# Patient Record
Sex: FEMALE | Race: WHITE | NOT HISPANIC OR LATINO | Employment: OTHER | URBAN - METROPOLITAN AREA
[De-identification: names, ages, dates, MRNs, and addresses within clinical notes are randomized per-mention and may not be internally consistent; named-entity substitution may affect disease eponyms.]

---

## 2024-02-09 ENCOUNTER — APPOINTMENT (OUTPATIENT)
Dept: OBSTETRICS AND GYNECOLOGY | Facility: CLINIC | Age: 74
End: 2024-02-09
Payer: MEDICARE

## 2024-09-19 ENCOUNTER — APPOINTMENT (OUTPATIENT)
Dept: CARDIOLOGY | Facility: HOSPITAL | Age: 74
End: 2024-09-19
Payer: MEDICARE

## 2024-09-19 ENCOUNTER — HOSPITAL ENCOUNTER (EMERGENCY)
Facility: HOSPITAL | Age: 74
Discharge: OTHER INSTITUTION | End: 2024-09-19
Attending: STUDENT IN AN ORGANIZED HEALTH CARE EDUCATION/TRAINING PROGRAM
Payer: MEDICARE

## 2024-09-19 ENCOUNTER — APPOINTMENT (OUTPATIENT)
Dept: RADIOLOGY | Facility: HOSPITAL | Age: 74
End: 2024-09-19
Payer: MEDICARE

## 2024-09-19 VITALS
RESPIRATION RATE: 16 BRPM | HEIGHT: 64 IN | SYSTOLIC BLOOD PRESSURE: 113 MMHG | DIASTOLIC BLOOD PRESSURE: 68 MMHG | BODY MASS INDEX: 20.93 KG/M2 | TEMPERATURE: 97.4 F | WEIGHT: 122.58 LBS | OXYGEN SATURATION: 99 % | HEART RATE: 57 BPM

## 2024-09-19 DIAGNOSIS — R74.01 TRANSAMINITIS: ICD-10-CM

## 2024-09-19 DIAGNOSIS — I46.9 CARDIAC ARREST: Primary | ICD-10-CM

## 2024-09-19 DIAGNOSIS — E87.20 LACTIC ACIDOSIS: ICD-10-CM

## 2024-09-19 DIAGNOSIS — R94.31 ABNORMAL EKG: ICD-10-CM

## 2024-09-19 LAB
ALBUMIN SERPL BCP-MCNC: 3.5 G/DL (ref 3.4–5)
ALP SERPL-CCNC: 143 U/L (ref 33–136)
ALT SERPL W P-5'-P-CCNC: 68 U/L (ref 7–45)
ANION GAP SERPL CALCULATED.3IONS-SCNC: 19 MMOL/L (ref 10–20)
APPEARANCE UR: ABNORMAL
AST SERPL W P-5'-P-CCNC: 194 U/L (ref 9–39)
BACTERIA #/AREA URNS AUTO: ABNORMAL /HPF
BILIRUB SERPL-MCNC: 0.6 MG/DL (ref 0–1.2)
BILIRUB UR STRIP.AUTO-MCNC: NEGATIVE MG/DL
BUN SERPL-MCNC: 7 MG/DL (ref 6–23)
CALCIUM SERPL-MCNC: 8.4 MG/DL (ref 8.6–10.3)
CARDIAC TROPONIN I PNL SERPL HS: 19 NG/L (ref 0–13)
CHLORIDE SERPL-SCNC: 101 MMOL/L (ref 98–107)
CO2 SERPL-SCNC: 20 MMOL/L (ref 21–32)
COLOR UR: YELLOW
CREAT SERPL-MCNC: 0.71 MG/DL (ref 0.5–1.05)
EGFRCR SERPLBLD CKD-EPI 2021: 89 ML/MIN/1.73M*2
ERYTHROCYTE [DISTWIDTH] IN BLOOD BY AUTOMATED COUNT: 18.7 % (ref 11.5–14.5)
GLUCOSE SERPL-MCNC: 149 MG/DL (ref 74–99)
GLUCOSE UR STRIP.AUTO-MCNC: ABNORMAL MG/DL
GRAN CASTS #/AREA UR COMP ASSIST: ABNORMAL /LPF
HCT VFR BLD AUTO: 43.5 % (ref 36–46)
HGB BLD-MCNC: 13.7 G/DL (ref 12–16)
HYALINE CASTS #/AREA URNS AUTO: ABNORMAL /LPF
INR PPP: 1.1 (ref 0.9–1.2)
KETONES UR STRIP.AUTO-MCNC: NEGATIVE MG/DL
LACTATE SERPL-SCNC: 4.5 MMOL/L (ref 0.4–2)
LACTATE SERPL-SCNC: 6.7 MMOL/L (ref 0.4–2)
LEUKOCYTE ESTERASE UR QL STRIP.AUTO: NEGATIVE
MCH RBC QN AUTO: 30.9 PG (ref 26–34)
MCHC RBC AUTO-ENTMCNC: 31.5 G/DL (ref 32–36)
MCV RBC AUTO: 98 FL (ref 80–100)
MUCOUS THREADS #/AREA URNS AUTO: ABNORMAL /LPF
NITRITE UR QL STRIP.AUTO: NEGATIVE
NRBC BLD-RTO: 0 /100 WBCS (ref 0–0)
PH UR STRIP.AUTO: 8 [PH]
PLATELET # BLD AUTO: 250 X10*3/UL (ref 150–450)
POTASSIUM SERPL-SCNC: 3.4 MMOL/L (ref 3.5–5.3)
PROT SERPL-MCNC: 7 G/DL (ref 6.4–8.2)
PROT UR STRIP.AUTO-MCNC: ABNORMAL MG/DL
PROTHROMBIN TIME: 11.8 SECONDS (ref 9.3–12.7)
RBC # BLD AUTO: 4.43 X10*6/UL (ref 4–5.2)
RBC # UR STRIP.AUTO: ABNORMAL /UL
RBC #/AREA URNS AUTO: >20 /HPF
SODIUM SERPL-SCNC: 137 MMOL/L (ref 136–145)
SP GR UR STRIP.AUTO: 1.01
SQUAMOUS #/AREA URNS AUTO: ABNORMAL /HPF
UROBILINOGEN UR STRIP.AUTO-MCNC: NORMAL MG/DL
WBC # BLD AUTO: 9.4 X10*3/UL (ref 4.4–11.3)
WBC #/AREA URNS AUTO: >50 /HPF

## 2024-09-19 PROCEDURE — 83605 ASSAY OF LACTIC ACID: CPT

## 2024-09-19 PROCEDURE — 80053 COMPREHEN METABOLIC PANEL: CPT | Performed by: STUDENT IN AN ORGANIZED HEALTH CARE EDUCATION/TRAINING PROGRAM

## 2024-09-19 PROCEDURE — 96375 TX/PRO/DX INJ NEW DRUG ADDON: CPT | Mod: 59

## 2024-09-19 PROCEDURE — 87086 URINE CULTURE/COLONY COUNT: CPT | Mod: TRILAB,WESLAB

## 2024-09-19 PROCEDURE — 85610 PROTHROMBIN TIME: CPT | Performed by: STUDENT IN AN ORGANIZED HEALTH CARE EDUCATION/TRAINING PROGRAM

## 2024-09-19 PROCEDURE — 93005 ELECTROCARDIOGRAM TRACING: CPT

## 2024-09-19 PROCEDURE — 70450 CT HEAD/BRAIN W/O DYE: CPT | Performed by: RADIOLOGY

## 2024-09-19 PROCEDURE — 36415 COLL VENOUS BLD VENIPUNCTURE: CPT

## 2024-09-19 PROCEDURE — 71275 CT ANGIOGRAPHY CHEST: CPT

## 2024-09-19 PROCEDURE — 2500000004 HC RX 250 GENERAL PHARMACY W/ HCPCS (ALT 636 FOR OP/ED): Performed by: STUDENT IN AN ORGANIZED HEALTH CARE EDUCATION/TRAINING PROGRAM

## 2024-09-19 PROCEDURE — 2500000004 HC RX 250 GENERAL PHARMACY W/ HCPCS (ALT 636 FOR OP/ED)

## 2024-09-19 PROCEDURE — 71045 X-RAY EXAM CHEST 1 VIEW: CPT

## 2024-09-19 PROCEDURE — 87075 CULTR BACTERIA EXCEPT BLOOD: CPT | Mod: TRILAB,59

## 2024-09-19 PROCEDURE — 94762 N-INVAS EAR/PLS OXIMTRY CONT: CPT

## 2024-09-19 PROCEDURE — 99291 CRITICAL CARE FIRST HOUR: CPT

## 2024-09-19 PROCEDURE — 96367 TX/PROPH/DG ADDL SEQ IV INF: CPT | Mod: 59

## 2024-09-19 PROCEDURE — 31500 INSERT EMERGENCY AIRWAY: CPT | Performed by: STUDENT IN AN ORGANIZED HEALTH CARE EDUCATION/TRAINING PROGRAM

## 2024-09-19 PROCEDURE — 96365 THER/PROPH/DIAG IV INF INIT: CPT

## 2024-09-19 PROCEDURE — 51702 INSERT TEMP BLADDER CATH: CPT

## 2024-09-19 PROCEDURE — 94799 UNLISTED PULMONARY SVC/PX: CPT

## 2024-09-19 PROCEDURE — 36415 COLL VENOUS BLD VENIPUNCTURE: CPT | Performed by: STUDENT IN AN ORGANIZED HEALTH CARE EDUCATION/TRAINING PROGRAM

## 2024-09-19 PROCEDURE — 81001 URINALYSIS AUTO W/SCOPE: CPT

## 2024-09-19 PROCEDURE — 72125 CT NECK SPINE W/O DYE: CPT | Performed by: RADIOLOGY

## 2024-09-19 PROCEDURE — 71045 X-RAY EXAM CHEST 1 VIEW: CPT | Performed by: RADIOLOGY

## 2024-09-19 PROCEDURE — 72125 CT NECK SPINE W/O DYE: CPT

## 2024-09-19 PROCEDURE — 2550000001 HC RX 255 CONTRASTS: Performed by: STUDENT IN AN ORGANIZED HEALTH CARE EDUCATION/TRAINING PROGRAM

## 2024-09-19 PROCEDURE — 85027 COMPLETE CBC AUTOMATED: CPT | Performed by: STUDENT IN AN ORGANIZED HEALTH CARE EDUCATION/TRAINING PROGRAM

## 2024-09-19 PROCEDURE — 92950 HEART/LUNG RESUSCITATION CPR: CPT

## 2024-09-19 PROCEDURE — 31500 INSERT EMERGENCY AIRWAY: CPT

## 2024-09-19 PROCEDURE — 2500000005 HC RX 250 GENERAL PHARMACY W/O HCPCS

## 2024-09-19 PROCEDURE — 71275 CT ANGIOGRAPHY CHEST: CPT | Performed by: RADIOLOGY

## 2024-09-19 PROCEDURE — 94002 VENT MGMT INPAT INIT DAY: CPT | Mod: 59

## 2024-09-19 PROCEDURE — 84484 ASSAY OF TROPONIN QUANT: CPT | Performed by: STUDENT IN AN ORGANIZED HEALTH CARE EDUCATION/TRAINING PROGRAM

## 2024-09-19 PROCEDURE — 2500000005 HC RX 250 GENERAL PHARMACY W/O HCPCS: Performed by: STUDENT IN AN ORGANIZED HEALTH CARE EDUCATION/TRAINING PROGRAM

## 2024-09-19 PROCEDURE — 70450 CT HEAD/BRAIN W/O DYE: CPT

## 2024-09-19 RX ORDER — CEFTRIAXONE 1 G/50ML
1 INJECTION, SOLUTION INTRAVENOUS ONCE
Status: COMPLETED | OUTPATIENT
Start: 2024-09-19 | End: 2024-09-19

## 2024-09-19 RX ORDER — LEVETIRACETAM 10 MG/ML
1000 INJECTION INTRAVASCULAR ONCE
Status: COMPLETED | OUTPATIENT
Start: 2024-09-19 | End: 2024-09-19

## 2024-09-19 RX ORDER — ETOMIDATE 2 MG/ML
20 INJECTION INTRAVENOUS ONCE
Status: COMPLETED | OUTPATIENT
Start: 2024-09-19 | End: 2024-09-19

## 2024-09-19 RX ORDER — FENTANYL CITRATE-0.9 % NACL/PF 10 MCG/ML
0-300 PLASTIC BAG, INJECTION (ML) INTRAVENOUS CONTINUOUS
Status: DISCONTINUED | OUTPATIENT
Start: 2024-09-19 | End: 2024-09-20 | Stop reason: HOSPADM

## 2024-09-19 RX ORDER — LORAZEPAM 2 MG/ML
4 INJECTION INTRAMUSCULAR ONCE
Status: COMPLETED | OUTPATIENT
Start: 2024-09-19 | End: 2024-09-19

## 2024-09-19 RX ORDER — NOREPINEPHRINE BITARTRATE/D5W 8 MG/250ML
0-.2 PLASTIC BAG, INJECTION (ML) INTRAVENOUS CONTINUOUS
Status: DISCONTINUED | OUTPATIENT
Start: 2024-09-19 | End: 2024-09-20 | Stop reason: HOSPADM

## 2024-09-19 RX ORDER — ROCURONIUM BROMIDE 10 MG/ML
60 INJECTION, SOLUTION INTRAVENOUS ONCE
Status: COMPLETED | OUTPATIENT
Start: 2024-09-19 | End: 2024-09-19

## 2024-09-19 RX ORDER — ATROPINE SULFATE 0.1 MG/ML
1 INJECTION INTRAVENOUS ONCE
Status: COMPLETED | OUTPATIENT
Start: 2024-09-19 | End: 2024-09-19

## 2024-09-19 RX ORDER — PROPOFOL 10 MG/ML
5-50 INJECTION, EMULSION INTRAVENOUS CONTINUOUS
Status: DISCONTINUED | OUTPATIENT
Start: 2024-09-19 | End: 2024-09-20 | Stop reason: HOSPADM

## 2024-09-19 RX ORDER — FENTANYL CITRATE 50 UG/ML
25 INJECTION, SOLUTION INTRAMUSCULAR; INTRAVENOUS ONCE
Status: COMPLETED | OUTPATIENT
Start: 2024-09-19 | End: 2024-09-19

## 2024-09-19 ASSESSMENT — PAIN - FUNCTIONAL ASSESSMENT: PAIN_FUNCTIONAL_ASSESSMENT: 0-10

## 2024-09-19 ASSESSMENT — PAIN SCALES - GENERAL: PAINLEVEL_OUTOF10: 0 - NO PAIN

## 2024-09-19 NOTE — ED PROVIDER NOTES
HPI   Chief Complaint   Patient presents with    Cardiac Arrest     Pt was at [facility] with her  and collapsed. EMS states CPR initiated and pt was in v-fib. ROSC achieved after 2 shocks.        Patient is a 74-year-old female presenting as a cardiac arrest from a restaurant.  Report is that patient was not feeling well and then when she went to the main she collapsed.  Report from EMS is that they administered 2 shocks as patient was in V-fib arrest.  They did achieve ROSC without needing for epinephrine.   at bedside states that the patient has had 2 open heart surgeries.  He states that he initially told the patient that he would call EMS but she stated that she did not need it before collapsing.  ROS is unable unable to be obtained due to patient's current status.  Patient was not intubated in the field.              Patient History   Past Medical History:   Diagnosis Date    Abnormal levels of other serum enzymes 11/08/2016    Elevated liver enzymes    Adjustment disorder with mixed anxiety and depressed mood 10/24/2016    Mixed emotional features as adjustment reaction of adolescence    Encounter for gynecological examination (general) (routine) without abnormal findings 07/13/2015    Well woman exam with routine gynecological exam    Encounter for screening for malignant neoplasm of cervix 07/13/2015    Cervical cancer screening    Encounter for screening for osteoporosis 07/13/2015    Osteoporosis screening    Essential (primary) hypertension 12/06/2016    Benign hypertension    Essential (primary) hypertension 10/24/2016    Benign essential hypertension    Nondisplaced fracture of fifth metatarsal bone, right foot, initial encounter for closed fracture 10/22/2020    Closed nondisplaced fracture of fifth metatarsal bone of right foot, initial encounter    Other specified postprocedural states 10/24/2016    S/P MVR (mitral valve repair)    Personal history of other diseases of the circulatory  system     History of atrial fibrillation    Personal history of other diseases of the circulatory system 10/24/2016    History of atrial flutter    Personal history of other diseases of the circulatory system 10/24/2016    History of mitral valve insufficiency    Personal history of other diseases of the circulatory system 10/24/2016    History of mitral valve prolapse    Personal history of other diseases of the musculoskeletal system and connective tissue 10/24/2016    History of temporomandibular joint disorder    Personal history of other endocrine, nutritional and metabolic disease 12/19/2016    History of thyroid nodule    Personal history of other medical treatment 07/16/2017    History of screening mammography    Personal history of other mental and behavioral disorders 12/19/2016    History of anxiety disorder    Personal history of other specified conditions 10/15/2019    History of chest pain    Personal history of other specified conditions 11/08/2016    History of palpitations    Tachycardia, unspecified 10/24/2016    Tachycardia     Past Surgical History:   Procedure Laterality Date    OTHER SURGICAL HISTORY  07/13/2015    Colonoscopic Polypectomy Via Colostomy    OTHER SURGICAL HISTORY  09/07/2017    Biopsy Thyroid Using Percutaneous Core Needle    OTHER SURGICAL HISTORY  09/07/2017    Reported Hx Of Hip Replacement - Right Side    OTHER SURGICAL HISTORY  11/08/2016    Tricuspid Valve Repair    US GUIDED THYROID BIOPSY  12/12/2016    US GUIDED THYROID BIOPSY 12/12/2016 [facility]     No family history on file.  Social History     Tobacco Use    Smoking status: Not on file    Smokeless tobacco: Not on file   Substance Use Topics    Alcohol use: Not on file    Drug use: Not on file       Physical Exam   ED Triage Vitals   Temperature Heart Rate Respirations BP   09/19/24 1635 09/19/24 1635 09/19/24 1635 09/19/24 1636   36.8 °C (98.2 °F) 82 20 116/88      Pulse Ox Temp Source Heart Rate Source  Patient Position   09/19/24 1635 09/19/24 1635 09/19/24 1635 09/19/24 1635   99 % Temporal Monitor Lying      BP Location FiO2 (%)     09/19/24 1635 --     Left arm        Physical Exam  Constitutional:       Comments: Awake but unresponsive   HENT:      Head: Normocephalic and atraumatic.      Nose: Nose normal.      Mouth/Throat:      Comments: Active secretions  Eyes:      Comments: Eyes rhythmically moving from left to right.  Pupils dilated.  Right pupil more reactive to light than left but both still reactive to light.   Cardiovascular:      Rate and Rhythm: Normal rate and regular rhythm.      Pulses: Normal pulses.      Heart sounds: Normal heart sounds.   Pulmonary:      Effort: Pulmonary effort is normal.      Breath sounds: Normal breath sounds.   Abdominal:      General: Abdomen is flat.      Palpations: Abdomen is soft.   Musculoskeletal:      Cervical back: Neck supple.      Comments: Unable to assess due to patient's current mental status   Skin:     General: Skin is warm and dry.      Capillary Refill: Capillary refill takes less than 2 seconds.   Neurological:      Comments: Unable to assess due to patient's mental status   Psychiatric:      Comments: Unable to assess due to patient's mental status           ED Course & MDM   ED Course as of 09/19/24 2049   u Sep 19, 2024   1705 Spoke with cardiology on-call at [facility], Dr. Noble.  After discussion, he states that he does not believe that this patient had a STEMI.  He did personally reviewed the EKG. [AR]   1925 I spoke with [facility] fellow on the CCU.  After discussion, they believe I should talk to MICU. [AR]   1936 I spoke with Dr. Tejeda, ICU attending at [facility].  After discussion, he states that he will accept the patient to his services. [AR]      ED Course User Index  [AR] Valentino Camacho PA-C         Diagnoses as of 09/19/24 2049   Cardiac arrest (Multi)   Transaminitis   Lactic acidosis                 No  data recorded                                 Medical Decision Making  Patient is a 74-year-old female presenting as a cardiac arrest from a restaurant.  Condition considered include but are not limited to: STEMI, PE, intracranial hemorrhage.    I saw this patient in conjunction with Dr. Flores.  There was concern for possible seizure as patient was clenching her teeth and having rotary eye movements with her eyes moving left to right.  4 mg Ativan, loading dose of Keppra ordered.  Patient was subsequently intubated upon arrival for airway protection.  She was maintaining airway and did have a pulse upon arrival.  Initial attempt by myself was unsuccessful.  Patient was clenching her teeth, making the intubation challenging.  Second attempt by attending physician immediately after was successful.    CT head, CT C-spine, CT PE ordered.  Chest x-ray.  Lab work ordered.    CBC is without leukocytosis or anemia.  CMP without significant electrolyte abnormality or renal impairment but does show signs of transaminitis.  UA with micro is without infection.  Troponin is elevated at 19.  Lactate elevated at 6.7 and is downtrending to 4.5.  Initial chest x-ray is confirming endotracheal placement and shows concern for possible aspiration versus opacity versus pneumonia.  CT head without acute findings.  CT C-spine without acute findings.  CT PE shows confirmation of endotracheal tube placement.  There is no PE.  There is concern for possible pneumonia versus aspiration as well as small bilateral pleural effusions with cardiomegaly and pulmonary hypertension.    While in the emergency department, patient transitioned over to CT scan and required atropine.  Patient's blood pressure has been soft consistently.  Multiple bouts of IV fluids have been ordered.  Patient is currently on a propofol and fentanyl drip.    I spoke with the CCU resident at [facility].  After discussion, this provider recommends I speak  with the MICU physician at [facility] as they believe this is the appropriate place.  After speaking with the MICU attending, patient has been accepted to [facility] under the care of Dr. Tejeda.  Patient will be stable in critical condition.  As I deemed necessary from the patient's history, physical, laboratory and imaging findings as well as ED course, I considered the above listed diagnoses.    Chest x-ray for OG placement is pending.    Upon my evaluation, this patient had a high probability of imminent or life threatening deterioration due to postcardiac arrest, which required my direct attention, intervention, and personal management.    I personally provided 33 minutes to nonconcurrent critical care time exclusive of time spent on separately billable procedures.  Time includes review of laboratory data, radiology results, discussion with consultants, and monitoring for potential decompensation.  Interventions were performed as documented above.    Portions of this note made with Dragon software, please be mindful of potential grammatical errors.        Medications   fentaNYL bolus from bag 25 mcg (has no administration in time range)   fentanyl (Sublimaze) 1000 mcg in sodium chloride 0.9% 100 mL (10 mcg/mL) infusion (premix) (50 mcg/hr intravenous New Bag 9/19/24 2019)   propofol (Diprivan) infusion (10 mcg/kg/min × 55.6 kg intravenous Rate/Dose Change 9/19/24 2024)   oxygen (O2) therapy (60 percent inhalation Start 9/19/24 1826)   norepinephrine (Levophed) 8 mg in dextrose 5% 250 mL (0.032 mg/mL) infusion (premix) (0.02 mcg/kg/min × 55.6 kg intravenous New Bag 9/19/24 2041)   levETIRAcetam (Keppra) 1,000 mg in sodium chloride (iso)  mL (0 mg intravenous Stopped 9/19/24 1715)   LORazepam (Ativan) injection 4 mg (4 mg intravenous Given 9/19/24 1655)   etomidate (Amidate) injection 20 mg (20 mg intravenous Given 9/19/24 1656)   rocuronium (ZeMuron) injection 60 mg (60 mg intravenous Given  9/19/24 1656)   fentaNYL PF (Sublimaze) injection 25 mcg (25 mcg intravenous Given 9/19/24 1711)   iohexol (OMNIPaque) 350 mg iodine/mL solution 75 mL (75 mL intravenous Given 9/19/24 1804)   atropine syringe 1 mg (1 mg intravenous Given 9/19/24 1829)   cefTRIAXone (Rocephin) 1 g in dextrose (iso) IV 50 mL (0 g intravenous Stopped 9/19/24 1905)   azithromycin 500 mg in dextrose 5% 250 mL IV (500 mg intravenous New Bag 9/19/24 1932)         Labs Reviewed   CBC - Abnormal       Result Value    WBC 9.4      nRBC 0.0      RBC 4.43      Hemoglobin 13.7      Hematocrit 43.5      MCV 98      MCH 30.9      MCHC 31.5 (*)     RDW 18.7 (*)     Platelets 250     COMPREHENSIVE METABOLIC PANEL - Abnormal    Glucose 149 (*)     Sodium 137      Potassium 3.4 (*)     Chloride 101      Bicarbonate 20 (*)     Anion Gap 19      Urea Nitrogen 7      Creatinine 0.71      eGFR 89      Calcium 8.4 (*)     Albumin 3.5      Alkaline Phosphatase 143 (*)     Total Protein 7.0       (*)     Bilirubin, Total 0.6      ALT 68 (*)    TROPONIN I, HIGH SENSITIVITY - Abnormal    Troponin I, High Sensitivity 19 (*)     Narrative:     Less than 99th percentile of normal range cutoff-  Female and children under 18 years old <14 ng/L; Male <21 ng/L: Negative  Repeat testing should be performed if clinically indicated.     Female and children under 18 years old 14-50 ng/L; Male 21-50 ng/L:  Consistent with possible cardiac damage and possible increased clinical   risk. Serial measurements may help to assess extent of myocardial damage.     >50 ng/L: Consistent with cardiac damage, increased clinical risk and  myocardial infarction. Serial measurements may help assess extent of   myocardial damage.      NOTE: Children less than 1 year old may have higher baseline troponin   levels and results should be interpreted in conjunction with the overall   clinical context.     NOTE: Troponin I testing is performed using a different   testing methodology at  [facility] than at other   [facility]. Direct result comparisons should only   be made within the same method.   LACTATE - Abnormal    Lactate 6.7 (*)     Narrative:     Venipuncture immediately after or during the administration of Metamizole may lead to falsely low results. Testing should be performed immediately prior to Metamizole dosing.   URINALYSIS WITH REFLEX CULTURE AND MICROSCOPIC - Abnormal    Color, Urine Yellow      Appearance, Urine Turbid (*)     Specific Gravity, Urine 1.013      pH, Urine 8.0      Protein, Urine 600 (3+) (*)     Glucose, Urine 100 (1+) (*)     Blood, Urine 0.06 (1+) (*)     Ketones, Urine NEGATIVE      Bilirubin, Urine NEGATIVE      Urobilinogen, Urine Normal      Nitrite, Urine NEGATIVE      Leukocyte Esterase, Urine NEGATIVE     LACTATE - Abnormal    Lactate 4.5 (*)     Narrative:     Venipuncture immediately after or during the administration of Metamizole may lead to falsely low results. Testing should be performed immediately prior to Metamizole dosing.   URINALYSIS MICROSCOPIC WITH REFLEX CULTURE - Abnormal    WBC, Urine >50 (*)     RBC, Urine >20 (*)     Squamous Epithelial Cells, Urine 1-9 (SPARSE)      Bacteria, Urine 4+ (*)     Mucus, Urine 1+      Hyaline Casts, Urine 3+ (*)     Fine Granular Casts, Urine 3+ (*)    PROTIME-INR - Normal    Protime 11.8      INR 1.1      Narrative:     INR Therapeutic Range: 2.0-3.5   BLOOD CULTURE   BLOOD CULTURE   URINE CULTURE   URINALYSIS WITH REFLEX CULTURE AND MICROSCOPIC    Narrative:     The following orders were created for panel order Urinalysis with Reflex Culture and Microscopic.  Procedure                               Abnormality         Status                     ---------                               -----------         ------                     Urinalysis with Reflex C...[[id number]]  Abnormal            Final result               Extra Urine Gray Tube[[id number]]                                                          Please view results for these tests on the individual orders.   EXTRA URINE GRAY TUBE   LACTATE         CT angio chest for pulmonary embolism   Final Result   1. The endotracheal tube terminates 3.5 cm superior to the sreedhar.   2. No evidence of pulmonary embolus to the level of the segmental   arteries. Subsegmental filling defects can not be excluded.   3. Consolidative opacities are noted within the posterior aspects of   the lower lobes as well as the dependent portions of the left upper   lobe/lingula and right middle lobe. While this may be in part be   related to volume loss the appearance is concerning for airspace   disease such as pneumonia or aspiration. Follow-up to resolution is   recommended.   4. Small bilateral pleural effusions.   5. Cardiomegaly with aortic and mitral valvular calcifications and   coronary artery calcifications.   6. Enlargement of the main pulmonary artery suggestive of pulmonary   arterial hypertension.   7. Findings suggestive of impaired right heart function, consider   echocardiographic correlation.   8. Aneurysmal dilatation of the ascending thoracic aorta measuring up   to 4.4 cm. Continued surveillance recommended.        MACRO:   None        Signed by: Bahman Pham 9/19/2024 6:33 PM   Dictation workstation:   [id number]      CT head wo IV contrast   Final Result   CT HEAD:   1. No acute intracranial abnormality or calvarial fracture.   2. No CT evidence of acute large territory infarction at this time.   If concern for anoxic injury in the setting of cardiac arrest, MRI is   recommended for further assessment.             CT CERVICAL SPINE:   1. No acute fracture or traumatic malalignment of the cervical spine.        MACRO:   None        Signed by: Bahman Pham 9/19/2024 6:19 PM   Dictation workstation:   [id number]      CT cervical spine wo IV contrast   Final Result   CT HEAD:   1. No acute intracranial abnormality or calvarial fracture.   2. No CT  evidence of acute large territory infarction at this time.   If concern for anoxic injury in the setting of cardiac arrest, MRI is   recommended for further assessment.             CT CERVICAL SPINE:   1. No acute fracture or traumatic malalignment of the cervical spine.        MACRO:   None        Signed by: Bahman Pham 9/19/2024 6:19 PM   Dictation workstation:   [id number]      XR chest 1 view   Final Result   1. Endotracheal tube terminates 4 cm superior to the sreedhar.   2. Small bilateral pleural effusions and patchy and consolidative   opacities. Pneumonia or aspiration not excluded.             Signed by: Bahman Pham 9/19/2024 6:35 PM   Dictation workstation:   [id number]      XR chest 1 view    (Results Pending)         Procedure  Procedures     Valentino Camacho PA-C  09/19/24 2052

## 2024-09-19 NOTE — ED NOTES
1633- pt brought in post rosc via squad, breathing assisted with bag/valve mask  1633- 2 new Ivs put in  1636-   1637-4mg iv ativan, 1000mg keppra  1644- 20 etomidate, 60 ROCC  1646- ETT 7.5, 1=21 at teeth, confirmed by color change and auscultation  1649- 2 L IVF started and pressure bagging.      Marie Méndez RN  09/19/24 0134

## 2024-09-20 LAB
ATRIAL RATE: 70 BPM
ATRIAL RATE: 86 BPM
PR INTERVAL: 128 MS
Q ONSET: 209 MS
Q ONSET: 224 MS
QRS COUNT: 12 BEATS
QRS COUNT: 14 BEATS
QRS DURATION: 100 MS
QRS DURATION: 106 MS
QT INTERVAL: 420 MS
QT INTERVAL: 486 MS
QTC CALCULATION(BAZETT): 502 MS
QTC CALCULATION(BAZETT): 524 MS
QTC FREDERICIA: 473 MS
QTC FREDERICIA: 511 MS
R AXIS: 231 DEGREES
R AXIS: 94 DEGREES
T AXIS: 89 DEGREES
T AXIS: 94 DEGREES
T OFFSET: 419 MS
T OFFSET: 467 MS
VENTRICULAR RATE: 70 BPM
VENTRICULAR RATE: 86 BPM

## 2024-09-20 NOTE — ED PROCEDURE NOTE
Procedure  Intubation    Performed by: Terell Flores DO  Authorized by: Terell Flores DO    Consent:     Consent obtained:  Emergent situation  Universal protocol:     Relevant documents present and verified: yes      Required blood products, implants, devices, and special equipment available: yes      Immediately prior to procedure, a time out was called: yes      Patient identity confirmed:  Arm band  Pre-procedure details:     Indications: airway protection and cardio/pulmonary arrest      Patient status:  Altered mental status    Look externally: no concerns      Mallampati score:  III    Obstruction: none      Neck mobility: normal      Pharmacologic strategy: RSI      Induction agents:  Etomidate    Paralytics:  Rocuronium  Procedure details:     Preoxygenation:  Bag valve mask    CPR in progress: no      Number of attempts:  1  Successful intubation attempt details:     Intubation method:  Oral    Intubation technique: video assisted      Laryngoscope blade:  Hypercurved    Bougie used: no      Grade view: I      Tube size (mm):  7.5    Tube type:  Cuffed    Tube visualized through cords: yes    Placement assessment:     Tube secured with:  ETT dowling    Breath sounds:  Equal and absent over the epigastrium    Placement verification: chest rise, colorimetric ETCO2, CXR verification, direct visualization, equal breath sounds, esophageal detector, numeric ETCO2, tube exhalation and waveform ETCO2      CXR findings:  Appropriate position  Post-procedure details:     Procedure completion:  Tolerated well, no immediate complications               Terell Flores DO  09/20/24 1533

## 2024-09-22 LAB
BACTERIA BLD CULT: NORMAL
BACTERIA BLD CULT: NORMAL
BACTERIA UR CULT: NORMAL

## 2024-09-24 LAB
BACTERIA BLD CULT: NORMAL
BACTERIA BLD CULT: NORMAL

## 2025-04-25 ENCOUNTER — TELEPHONE (OUTPATIENT)
Dept: OBSTETRICS AND GYNECOLOGY | Facility: CLINIC | Age: 75
End: 2025-04-25
Payer: MEDICARE